# Patient Record
Sex: MALE | URBAN - METROPOLITAN AREA
[De-identification: names, ages, dates, MRNs, and addresses within clinical notes are randomized per-mention and may not be internally consistent; named-entity substitution may affect disease eponyms.]

---

## 2020-06-17 ENCOUNTER — NEW PATIENT (OUTPATIENT)
Dept: URBAN - METROPOLITAN AREA CLINIC 30 | Facility: CLINIC | Age: 45
End: 2020-06-17
Payer: COMMERCIAL

## 2020-06-17 DIAGNOSIS — E11.39 TYPE 2 DIABETES MELLITUS WITH OPHTHALMIC COMPLICAT: ICD-10-CM

## 2020-06-17 PROCEDURE — 92134 CPTRZ OPH DX IMG PST SGM RTA: CPT | Performed by: OPTOMETRIST

## 2020-06-17 PROCEDURE — 92004 COMPRE OPH EXAM NEW PT 1/>: CPT | Performed by: OPTOMETRIST

## 2020-06-17 ASSESSMENT — KERATOMETRY
OD: 43.41
OS: 43.75

## 2020-06-17 ASSESSMENT — VISUAL ACUITY
OD: 20/20
OS: 20/25

## 2020-06-17 ASSESSMENT — INTRAOCULAR PRESSURE
OS: 13
OD: 12

## 2021-01-22 ENCOUNTER — FOLLOW UP ESTABLISHED (OUTPATIENT)
Dept: URBAN - METROPOLITAN AREA CLINIC 30 | Facility: CLINIC | Age: 46
End: 2021-01-22
Payer: COMMERCIAL

## 2021-01-22 DIAGNOSIS — E11.3293 TYPE 2 DIABETES MELLITUS WITH MILD NONPROLIFERATIVE DIABETIC RETINOPATHY WITHOUT MACULAR EDEMA, BILATERAL: Primary | ICD-10-CM

## 2021-01-22 DIAGNOSIS — Z79.84 LONG TERM (CURRENT) USE OF ORAL HYPOGLYCEMIC DRUGS: ICD-10-CM

## 2021-01-22 PROCEDURE — 92134 CPTRZ OPH DX IMG PST SGM RTA: CPT | Performed by: OPTOMETRIST

## 2021-01-22 PROCEDURE — 92014 COMPRE OPH EXAM EST PT 1/>: CPT | Performed by: OPTOMETRIST

## 2021-01-22 ASSESSMENT — INTRAOCULAR PRESSURE
OS: 11
OD: 11

## 2021-01-22 ASSESSMENT — VISUAL ACUITY
OD: 20/20
OS: 20/20

## 2021-01-22 ASSESSMENT — KERATOMETRY
OD: 43.44
OS: 43.89

## 2022-04-22 ENCOUNTER — OFFICE VISIT (OUTPATIENT)
Dept: URBAN - METROPOLITAN AREA CLINIC 30 | Facility: CLINIC | Age: 47
End: 2022-04-22
Payer: MEDICARE

## 2022-04-22 DIAGNOSIS — H43.393 OTHER VITREOUS OPACITIES, BILATERAL: ICD-10-CM

## 2022-04-22 DIAGNOSIS — E11.3293 TYPE 2 DIABETES MELLITUS WITH MILD NONPROLIFERATIVE DIABETIC RETINOPATHY WITHOUT MACULAR EDEMA, BILATERAL: Primary | ICD-10-CM

## 2022-04-22 DIAGNOSIS — Z79.84 LONG TERM (CURRENT) USE OF ORAL HYPOGLYCEMIC DRUGS: ICD-10-CM

## 2022-04-22 PROCEDURE — 92014 COMPRE OPH EXAM EST PT 1/>: CPT | Performed by: OPTOMETRIST

## 2022-04-22 PROCEDURE — 92134 CPTRZ OPH DX IMG PST SGM RTA: CPT | Performed by: OPTOMETRIST

## 2022-04-22 ASSESSMENT — INTRAOCULAR PRESSURE
OS: 15
OD: 16

## 2022-04-22 ASSESSMENT — KERATOMETRY
OD: 43.58
OS: 43.87

## 2022-04-22 ASSESSMENT — VISUAL ACUITY
OS: 20/30
OD: 20/30

## 2022-04-22 NOTE — IMPRESSION/PLAN
Impression: Diabetes mellitus Type 2 with mild non-proliferative retinopathy without macular edema, bilateral Plan: Stable, mild NPDR OU. No DME OU. Emphasized importance of strict BS control, diet, exercise, and BP control to avoid risk of loss of vision. Current A1C 6.7 per pt. See mac oct findings.

## 2023-05-23 ENCOUNTER — OFFICE VISIT (OUTPATIENT)
Dept: URBAN - METROPOLITAN AREA CLINIC 30 | Facility: CLINIC | Age: 48
End: 2023-05-23
Payer: COMMERCIAL

## 2023-05-23 DIAGNOSIS — E11.3393 DIABETES MELLITUS TYPE 2 WITH MODERATE NON-PROLIFERATIVE RETINOPATHY WITHOUT MACULAR EDEMA, BILATERAL: Primary | ICD-10-CM

## 2023-05-23 DIAGNOSIS — H43.393 OTHER VITREOUS OPACITIES, BILATERAL: ICD-10-CM

## 2023-05-23 DIAGNOSIS — Z79.84 LONG TERM (CURRENT) USE OF ORAL HYPOGLYCEMIC DRUGS: ICD-10-CM

## 2023-05-23 PROCEDURE — 92014 COMPRE OPH EXAM EST PT 1/>: CPT | Performed by: OPTOMETRIST

## 2023-05-23 PROCEDURE — 92134 CPTRZ OPH DX IMG PST SGM RTA: CPT | Performed by: OPTOMETRIST

## 2023-05-23 ASSESSMENT — INTRAOCULAR PRESSURE
OS: 16
OD: 16

## 2023-05-23 ASSESSMENT — VISUAL ACUITY
OS: 20/25
OD: 20/20

## 2023-05-23 ASSESSMENT — KERATOMETRY
OD: 43.78
OS: 44.15

## 2023-05-23 NOTE — IMPRESSION/PLAN
Impression: Diabetes mellitus Type 2 with moderate non-proliferative retinopathy without macular edema, bilateral: G96.4121. Plan: Mod diabetic retinopathy OU. No Diabetic Macular Edema noted OU. Emphasized importance of strict BS control, diet, exercise, and BP control to avoid risk of loss of vision. Recommend regular, ongoing follow-ups with their PCP to monitor DM as well. Most recent A1C 8.5, per pt.

## 2023-05-23 NOTE — IMPRESSION/PLAN
Impression: Long term (current) use of oral antidiabetic drugs Plan: see other diabetic plan for correlations.

## 2024-02-01 ENCOUNTER — OFFICE VISIT (OUTPATIENT)
Dept: URBAN - METROPOLITAN AREA CLINIC 30 | Facility: CLINIC | Age: 49
End: 2024-02-01
Payer: MEDICARE

## 2024-02-01 DIAGNOSIS — E11.3393 DIABETES MELLITUS TYPE 2 WITH MODERATE NON-PROLIFERATIVE RETINOPATHY WITHOUT MACULAR EDEMA, BILATERAL: Primary | ICD-10-CM

## 2024-02-01 DIAGNOSIS — H43.393 OTHER VITREOUS OPACITIES, BILATERAL: ICD-10-CM

## 2024-02-01 DIAGNOSIS — Z79.84 LONG TERM (CURRENT) USE OF ORAL HYPOGLYCEMIC DRUGS: ICD-10-CM

## 2024-02-01 PROCEDURE — 92014 COMPRE OPH EXAM EST PT 1/>: CPT | Performed by: OPTOMETRIST

## 2024-02-01 PROCEDURE — 92134 CPTRZ OPH DX IMG PST SGM RTA: CPT | Performed by: OPTOMETRIST

## 2024-02-01 ASSESSMENT — KERATOMETRY
OD: 43.72
OS: 43.95

## 2024-02-01 ASSESSMENT — INTRAOCULAR PRESSURE
OS: 15
OD: 15

## 2024-02-01 ASSESSMENT — VISUAL ACUITY
OD: 20/25
OS: 20/20

## 2024-08-27 ENCOUNTER — OFFICE VISIT (OUTPATIENT)
Dept: URBAN - METROPOLITAN AREA CLINIC 30 | Facility: CLINIC | Age: 49
End: 2024-08-27
Payer: MEDICARE

## 2024-08-27 DIAGNOSIS — Z79.84 LONG TERM (CURRENT) USE OF ORAL HYPOGLYCEMIC DRUGS: ICD-10-CM

## 2024-08-27 DIAGNOSIS — E11.3392 DIABETES MELLITUS TYPE 2 WITH MODERATE NON-PROLIFERATIVE RETINOPATHY WITHOUT MACULAR EDEMA, LEFT EYE: ICD-10-CM

## 2024-08-27 DIAGNOSIS — H43.393 OTHER VITREOUS OPACITIES, BILATERAL: ICD-10-CM

## 2024-08-27 DIAGNOSIS — E11.3311 DIABETES MELLITUS TYPE 2 WITH MODERATE NON-PROLIFERATIVE RETINOPATHY WITH MACULAR EDEMA, RIGHT EYE: Primary | ICD-10-CM

## 2024-08-27 PROCEDURE — 92134 CPTRZ OPH DX IMG PST SGM RTA: CPT | Performed by: OPTOMETRIST

## 2024-08-27 PROCEDURE — 99214 OFFICE O/P EST MOD 30 MIN: CPT | Performed by: OPTOMETRIST

## 2024-08-27 ASSESSMENT — INTRAOCULAR PRESSURE
OD: 13
OS: 13

## 2024-08-27 ASSESSMENT — KERATOMETRY
OD: 43.77
OS: 44.33

## 2024-08-27 ASSESSMENT — VISUAL ACUITY
OS: 20/20
OD: 20/20

## 2024-10-03 ENCOUNTER — OFFICE VISIT (OUTPATIENT)
Dept: URBAN - METROPOLITAN AREA CLINIC 30 | Facility: CLINIC | Age: 49
End: 2024-10-03
Payer: MEDICARE

## 2024-10-03 DIAGNOSIS — E11.3392 TYPE 2 DIAB W MODERATE NONPRLF DIAB RTNOP W/O MACULAR EDEMA, LEFT EYE: ICD-10-CM

## 2024-10-03 DIAGNOSIS — E11.3311 TYPE 2 DIAB W MODERATE NONPRLF DIAB RTNOP W MACULAR EDEMA, RIGHT EYE: Primary | ICD-10-CM

## 2024-10-03 PROCEDURE — 92134 CPTRZ OPH DX IMG PST SGM RTA: CPT | Performed by: OPHTHALMOLOGY

## 2024-10-03 PROCEDURE — 99204 OFFICE O/P NEW MOD 45 MIN: CPT | Performed by: OPHTHALMOLOGY

## 2024-10-03 PROCEDURE — 92235 FLUORESCEIN ANGRPH MLTIFRAME: CPT | Performed by: OPHTHALMOLOGY

## 2024-10-03 ASSESSMENT — INTRAOCULAR PRESSURE
OS: 13
OD: 12

## 2024-10-31 ENCOUNTER — OFFICE VISIT (OUTPATIENT)
Dept: URBAN - METROPOLITAN AREA CLINIC 30 | Facility: CLINIC | Age: 49
End: 2024-10-31
Payer: MEDICARE

## 2024-10-31 DIAGNOSIS — E11.3311 TYPE 2 DIAB W MODERATE NONPRLF DIAB RTNOP W MACULAR EDEMA, RIGHT EYE: Primary | ICD-10-CM

## 2024-10-31 PROCEDURE — 67028 INJECTION EYE DRUG: CPT | Performed by: OPHTHALMOLOGY

## 2024-10-31 PROCEDURE — 92134 CPTRZ OPH DX IMG PST SGM RTA: CPT | Performed by: OPHTHALMOLOGY

## 2024-10-31 ASSESSMENT — INTRAOCULAR PRESSURE
OD: 20
OS: 16

## 2024-12-13 ENCOUNTER — OFFICE VISIT (OUTPATIENT)
Dept: URBAN - METROPOLITAN AREA CLINIC 30 | Facility: CLINIC | Age: 49
End: 2024-12-13
Payer: MEDICARE

## 2024-12-13 DIAGNOSIS — E11.3311 TYPE 2 DIABETES MELLITUS WITH MODERATE NONPROLIFERATIVE DIABETIC RETINOPATHY WITH MACULAR EDEMA, RIGHT EYE: Primary | ICD-10-CM

## 2024-12-13 PROCEDURE — 67028 INJECTION EYE DRUG: CPT | Performed by: OPHTHALMOLOGY

## 2024-12-13 PROCEDURE — 92134 CPTRZ OPH DX IMG PST SGM RTA: CPT | Performed by: OPHTHALMOLOGY

## 2024-12-13 ASSESSMENT — INTRAOCULAR PRESSURE
OS: 19
OD: 18

## 2025-01-23 ENCOUNTER — OFFICE VISIT (OUTPATIENT)
Dept: URBAN - METROPOLITAN AREA CLINIC 30 | Facility: CLINIC | Age: 50
End: 2025-01-23
Payer: MEDICARE

## 2025-01-23 DIAGNOSIS — E11.3311 TYPE 2 DIABETES MELLITUS WITH MODERATE NONPROLIFERATIVE DIABETIC RETINOPATHY WITH MACULAR EDEMA, RIGHT EYE: Primary | ICD-10-CM

## 2025-01-23 PROCEDURE — 67028 INJECTION EYE DRUG: CPT | Performed by: OPHTHALMOLOGY

## 2025-01-23 PROCEDURE — 92134 CPTRZ OPH DX IMG PST SGM RTA: CPT | Performed by: OPHTHALMOLOGY

## 2025-01-23 ASSESSMENT — INTRAOCULAR PRESSURE
OD: 20
OS: 17

## 2025-02-20 ENCOUNTER — OFFICE VISIT (OUTPATIENT)
Dept: URBAN - METROPOLITAN AREA CLINIC 30 | Facility: CLINIC | Age: 50
End: 2025-02-20
Payer: MEDICARE

## 2025-02-20 DIAGNOSIS — E11.3311 TYPE 2 DIAB W MODERATE NONPRLF DIAB RTNOP W MACULAR EDEMA, RIGHT EYE: Primary | ICD-10-CM

## 2025-02-20 PROCEDURE — 92134 CPTRZ OPH DX IMG PST SGM RTA: CPT | Performed by: OPHTHALMOLOGY

## 2025-02-20 PROCEDURE — 67028 INJECTION EYE DRUG: CPT | Performed by: OPHTHALMOLOGY

## 2025-02-20 ASSESSMENT — INTRAOCULAR PRESSURE
OS: 14
OD: 14

## 2025-03-20 ENCOUNTER — OFFICE VISIT (OUTPATIENT)
Dept: URBAN - METROPOLITAN AREA CLINIC 30 | Facility: CLINIC | Age: 50
End: 2025-03-20
Payer: MEDICARE

## 2025-03-20 DIAGNOSIS — E11.3311 TYPE 2 DIABETES MELLITUS WITH MODERATE NONPROLIFERATIVE DIABETIC RETINOPATHY WITH MACULAR EDEMA, RIGHT EYE: Primary | ICD-10-CM

## 2025-03-20 DIAGNOSIS — E11.3392 TYPE 2 DIAB W MODERATE NONPRLF DIAB RTNOP W/O MACULAR EDEMA, LEFT EYE: ICD-10-CM

## 2025-03-20 PROCEDURE — 67028 INJECTION EYE DRUG: CPT | Performed by: OPHTHALMOLOGY

## 2025-03-20 PROCEDURE — 99214 OFFICE O/P EST MOD 30 MIN: CPT | Performed by: OPHTHALMOLOGY

## 2025-03-20 PROCEDURE — 92134 CPTRZ OPH DX IMG PST SGM RTA: CPT | Performed by: OPHTHALMOLOGY

## 2025-03-20 ASSESSMENT — INTRAOCULAR PRESSURE
OS: 18
OD: 17